# Patient Record
(demographics unavailable — no encounter records)

---

## 2024-10-14 NOTE — ASSESSMENT
[FreeTextEntry1] : urolith  send stone for analyisis  f/u ct report  cap  mri reviewed  new pirad 4 lesion adjacent to previously treated site  reviewed options  favor repeat prostate biopsy

## 2024-10-14 NOTE — HISTORY OF PRESENT ILLNESS
[FreeTextEntry1] : cc f/u cap Ruslan Mendiola returns to the office today. He is a 77-year-old man who  underwent focal cryoablation of the prostate for treatment of a clinically localized prostate cancer, [place] 3+4 disease in the left side of the prostate gland. This was detected on biopsy in late September. His procedure was performed November 2022. He is doing  psa  5.0 5 8/23 pt now reports h/o bladder cancer  last cysto 2023  now reports slow flow and frequency  renal sono showed enlarging right renal cyst with possible solid component 1 cm  f/u ct benign cyst and  2 mm stone  s/p surveillance bx  path funmilayo 6 at prior treatment  2 new cores funmilayo 6   c/o ed libido intact  did not start sildenafil   recent visit to ed for sp pain  passed  sofiaskayla stoner

## 2024-11-08 NOTE — END OF VISIT
[FreeTextEntry4] : This note was written by Marquez Barillas on 11/08/2024 actively solely Jim Caldwell M.D. I, Marquez Barillas, am scribing for and in the presence of Jim Caldwell M.D. in the following sections HISTORY OF PRESENT ILLNESS, PAST MEDICAL/FAMILY/SOCIAL HISTORY; REVIEW OF SYSTEMS; VITAL SIGNS; PHYSICAL EXAM; ASSESSMENT/PLAN. All medical record entries made by this scribe at , Jim Caldwell M.D. direction and personally dictated by me on 11/08/2024. I personally performed the services described in the documentation, reviewed the documentation recorded by the scribe in my presence, and it accurately and completely records my words and actions.

## 2024-11-08 NOTE — HISTORY OF PRESENT ILLNESS
[FreeTextEntry1] : 10/11/2024 cc f/u shanta Mendiola returns to the office today. He is a 77-year-old man who underwent focal cryoablation of the prostate for treatment of a clinically localized prostate cancer, [place] 3+4 disease in the left side of the prostate gland. This was detected on biopsy in late September. His procedure was performed November 2022. He is doing psa 5.0 5 8/23 pt now reports h/o bladder cancer last cysto 2023  now reports slow flow and frequency renal sono showed enlarging right renal cyst with possible solid component 1 cm f/u ct benign cyst and 2 mm stone  s/p surveillance bx path funmilayo 6 at prior treatment 2 new cores funmilayo 6  c/o ed libido intact did not start sildenafil  recent visit to ed for sp pain passed onel stoner  11/08/2024 cc Pt is a 77 year old male presenting